# Patient Record
Sex: FEMALE | Race: WHITE | NOT HISPANIC OR LATINO
[De-identification: names, ages, dates, MRNs, and addresses within clinical notes are randomized per-mention and may not be internally consistent; named-entity substitution may affect disease eponyms.]

---

## 2018-05-24 ENCOUNTER — RECORD ABSTRACTING (OUTPATIENT)
Age: 63
End: 2018-05-24

## 2018-05-24 DIAGNOSIS — Z83.79 FAMILY HISTORY OF OTHER DISEASES OF THE DIGESTIVE SYSTEM: ICD-10-CM

## 2018-05-24 RX ORDER — RABEPRAZOLE SODIUM 20 MG/1
20 TABLET, DELAYED RELEASE ORAL DAILY
Refills: 0 | Status: ACTIVE | COMMUNITY

## 2018-05-24 RX ORDER — APIXABAN 5 MG/1
5 TABLET, FILM COATED ORAL
Refills: 0 | Status: ACTIVE | COMMUNITY

## 2018-05-24 RX ORDER — DESLORATADINE 5 MG/1
5 TABLET ORAL DAILY
Refills: 0 | Status: ACTIVE | COMMUNITY

## 2018-05-24 RX ORDER — UBIDECARENONE 200 MG
CAPSULE ORAL
Refills: 0 | Status: ACTIVE | COMMUNITY

## 2018-05-24 RX ORDER — ADHESIVE TAPE 3"X 2.3 YD
50 MCG TAPE, NON-MEDICATED TOPICAL
Refills: 0 | Status: ACTIVE | COMMUNITY

## 2018-05-24 RX ORDER — MONTELUKAST SODIUM 10 MG/1
10 TABLET, FILM COATED ORAL DAILY
Refills: 0 | Status: ACTIVE | COMMUNITY

## 2018-05-24 RX ORDER — FLUTICASONE PROPIONATE AND SALMETEROL XINAFOATE 115; 21 UG/1; UG/1
115-21 AEROSOL, METERED RESPIRATORY (INHALATION) TWICE DAILY
Refills: 0 | Status: ACTIVE | COMMUNITY

## 2018-06-28 ENCOUNTER — APPOINTMENT (OUTPATIENT)
Dept: BREAST CENTER | Facility: CLINIC | Age: 63
End: 2018-06-28
Payer: COMMERCIAL

## 2018-06-28 VITALS
WEIGHT: 167 LBS | HEIGHT: 62 IN | SYSTOLIC BLOOD PRESSURE: 129 MMHG | DIASTOLIC BLOOD PRESSURE: 67 MMHG | HEART RATE: 81 BPM | BODY MASS INDEX: 30.73 KG/M2

## 2018-06-28 PROCEDURE — 99213 OFFICE O/P EST LOW 20 MIN: CPT

## 2018-12-19 ENCOUNTER — MOBILE ON CALL (OUTPATIENT)
Age: 63
End: 2018-12-19

## 2019-01-10 ENCOUNTER — APPOINTMENT (OUTPATIENT)
Dept: BREAST CENTER | Facility: CLINIC | Age: 64
End: 2019-01-10
Payer: MEDICARE

## 2019-01-10 VITALS
HEIGHT: 62 IN | SYSTOLIC BLOOD PRESSURE: 132 MMHG | BODY MASS INDEX: 30.91 KG/M2 | HEART RATE: 82 BPM | DIASTOLIC BLOOD PRESSURE: 70 MMHG | WEIGHT: 168 LBS

## 2019-01-10 DIAGNOSIS — Z87.898 PERSONAL HISTORY OF OTHER SPECIFIED CONDITIONS: ICD-10-CM

## 2019-01-10 DIAGNOSIS — Z86.79 PERSONAL HISTORY OF OTHER DISEASES OF THE CIRCULATORY SYSTEM: ICD-10-CM

## 2019-01-10 DIAGNOSIS — Z86.69 PERSONAL HISTORY OF OTHER DISEASES OF THE NERVOUS SYSTEM AND SENSE ORGANS: ICD-10-CM

## 2019-01-10 DIAGNOSIS — Z87.39 PERSONAL HISTORY OF OTHER DISEASES OF THE MUSCULOSKELETAL SYSTEM AND CONNECTIVE TISSUE: ICD-10-CM

## 2019-01-10 DIAGNOSIS — Z86.39 PERSONAL HISTORY OF OTHER ENDOCRINE, NUTRITIONAL AND METABOLIC DISEASE: ICD-10-CM

## 2019-01-10 DIAGNOSIS — E06.1 SUBACUTE THYROIDITIS: ICD-10-CM

## 2019-01-10 PROCEDURE — 99214 OFFICE O/P EST MOD 30 MIN: CPT

## 2019-01-10 RX ORDER — DILTIAZEM HYDROCHLORIDE 120 MG/1
120 CAPSULE, EXTENDED RELEASE ORAL
Refills: 0 | Status: ACTIVE | COMMUNITY

## 2019-01-10 RX ORDER — OLOPATADINE HCL 1 MG/ML
0.1 SOLUTION/ DROPS OPHTHALMIC
Refills: 0 | Status: DISCONTINUED | COMMUNITY
End: 2019-01-10

## 2019-01-10 RX ORDER — MULTIVIT-MIN/IRON/FOLIC ACID/K 18-600-40
400 CAPSULE ORAL
Refills: 0 | Status: ACTIVE | COMMUNITY

## 2019-01-10 RX ORDER — DILTIAZEM HYDROCHLORIDE 120 MG/1
120 CAPSULE, COATED, EXTENDED RELEASE ORAL DAILY
Refills: 0 | Status: DISCONTINUED | COMMUNITY
End: 2019-01-10

## 2019-01-10 RX ORDER — LEVALBUTEROL TARTRATE 45 UG/1
45 AEROSOL, METERED ORAL
Refills: 0 | Status: ACTIVE | COMMUNITY

## 2019-01-10 RX ORDER — CHLORHEXIDINE GLUCONATE 4 %
325 (65 FE) LIQUID (ML) TOPICAL DAILY
Refills: 0 | Status: DISCONTINUED | COMMUNITY
End: 2019-01-10

## 2019-01-10 RX ORDER — EZETIMIBE 10 MG/1
10 TABLET ORAL
Refills: 0 | Status: DISCONTINUED | COMMUNITY
End: 2019-01-10

## 2019-01-10 NOTE — HISTORY OF PRESENT ILLNESS
[FreeTextEntry1] : Pt had allergic rxn to CT Iodine dye and PCN.\par D/c'ed Zetia s/t sx's\par S/P R Br Bx (11/22/93): Benign\par +L Ov Cyst (TVU)(12/15/17) > 6 mos F/U rec'ed > had NOT had yet > discussed.\par No MH/FH changes. ROS reviewed/discussed. Taking Ca/Vit D. Vit D level (9/17): 39. Last Bone Densitometry (12/15): +osteopenia\par Mammo/Sono (12/17/18): NSF\par Thyroid Sono (8/10/18): +sl increased bilat thyroid nodules

## 2019-01-10 NOTE — PHYSICAL EXAM
[Normocephalic] : normocephalic [Atraumatic] : atraumatic [Supple] : supple [No Supraclavicular Adenopathy] : no supraclavicular adenopathy [No Cervical Adenopathy] : no cervical adenopathy [Normal Sinus Rhythm] : normal sinus rhythm [Examined in the supine and seated position] : examined in the supine and seated position [No dominant masses] : no dominant masses in right breast  [No dominant masses] : no dominant masses left breast [No Nipple Retraction] : no left nipple retraction [No Nipple Discharge] : no left nipple discharge [No Axillary Lymphadenopathy] : no left axillary lymphadenopathy [No Edema] : no edema [No Rashes] : no rashes [No Ulceration] : no ulceration [de-identified] : +thyromegaly > old

## 2019-07-11 ENCOUNTER — APPOINTMENT (OUTPATIENT)
Dept: BREAST CENTER | Facility: CLINIC | Age: 64
End: 2019-07-11
Payer: MEDICARE

## 2019-07-11 VITALS
WEIGHT: 172 LBS | SYSTOLIC BLOOD PRESSURE: 133 MMHG | HEIGHT: 62 IN | BODY MASS INDEX: 31.65 KG/M2 | HEART RATE: 94 BPM | DIASTOLIC BLOOD PRESSURE: 86 MMHG

## 2019-07-11 DIAGNOSIS — E07.9 DISORDER OF THYROID, UNSPECIFIED: ICD-10-CM

## 2019-07-11 PROCEDURE — 99214 OFFICE O/P EST MOD 30 MIN: CPT

## 2019-07-11 RX ORDER — PYRIDOXINE HCL (VITAMIN B6) 50 MG
50 TABLET ORAL
Refills: 0 | Status: DISCONTINUED | COMMUNITY
End: 2019-07-11

## 2019-07-11 NOTE — HISTORY OF PRESENT ILLNESS
[FreeTextEntry1] : Pt had allergic rxn to CT Iodine dye and PCN.\par D/c'ed Zetia s/t sx's\par S/P R Br Bx (11/22/93): Benign\par +L Ov Cyst (TVU)(12/15/17) > 6 mos F/U rec'ed > had NOT had yet > discussed.\par Pt had elevated Vit B6 level > d/c'ed\par Seeing Ophth re: L optic nerve problems\par No other MH/FH changes. ROS reviewed/discussed. Taking Ca/Vit D. Vit D level (9/17): 39. Last Bone Densitometry (12/15): +osteopenia\par Mammo/Sono (12/17/18): NSF\par Thyroid Sono (8/10/18): +sl increased bilat thyroid nodules

## 2019-07-11 NOTE — PHYSICAL EXAM
[Normocephalic] : normocephalic [Atraumatic] : atraumatic [Supple] : supple [No Supraclavicular Adenopathy] : no supraclavicular adenopathy [No Cervical Adenopathy] : no cervical adenopathy [Normal Sinus Rhythm] : normal sinus rhythm [Examined in the supine and seated position] : examined in the supine and seated position [No dominant masses] : no dominant masses in right breast  [No dominant masses] : no dominant masses left breast [No Nipple Retraction] : no left nipple retraction [No Nipple Discharge] : no left nipple discharge [No Axillary Lymphadenopathy] : no left axillary lymphadenopathy [No Edema] : no edema [No Rashes] : no rashes [No Ulceration] : no ulceration [de-identified] : +diffusely enlarged gland

## 2020-01-23 ENCOUNTER — APPOINTMENT (OUTPATIENT)
Dept: BREAST CENTER | Facility: CLINIC | Age: 65
End: 2020-01-23
Payer: MEDICARE

## 2020-01-23 VITALS
HEART RATE: 67 BPM | BODY MASS INDEX: 32.57 KG/M2 | DIASTOLIC BLOOD PRESSURE: 71 MMHG | HEIGHT: 62 IN | WEIGHT: 177 LBS | SYSTOLIC BLOOD PRESSURE: 144 MMHG

## 2020-01-23 PROCEDURE — 99214 OFFICE O/P EST MOD 30 MIN: CPT

## 2020-01-23 RX ORDER — VALACYCLOVIR 1 G/1
1 TABLET, FILM COATED ORAL
Refills: 0 | Status: DISCONTINUED | COMMUNITY
End: 2020-01-23

## 2020-01-23 NOTE — HISTORY OF PRESENT ILLNESS
[FreeTextEntry1] : Pt had allergic rxn to CT Iodine dye and PCN.\par D/c'ed Zetia s/t sx's\par S/P R Br Bx (11/22/93): Benign\par +L Ov Cyst (TVU)(12/15/17) > 6 mos F/U rec'ed > had NOT had yet > discussed.\par Pt had elevated Vit B6 level > d/c'ed\par Seeing Ophth re: L optic nerve problems\par On Eliquis for A. FIb\par No other MH/FH changes. ROS reviewed/discussed. Taking Ca/Vit D. Vit D level (7/19): 36. Last Bone Densitometry (12/19): +osteopenia\par Mammo/Sono (12/17/19): HD, NSF\par Thyroid Sono (12/12/19): +multinod goiter > Rad[employer] wants old films or 6 mos F/U

## 2020-01-23 NOTE — PHYSICAL EXAM
[Normocephalic] : normocephalic [Atraumatic] : atraumatic [Supple] : supple [No Supraclavicular Adenopathy] : no supraclavicular adenopathy [No Cervical Adenopathy] : no cervical adenopathy [Normal Sinus Rhythm] : normal sinus rhythm [Examined in the supine and seated position] : examined in the supine and seated position [No dominant masses] : no dominant masses in right breast  [No dominant masses] : no dominant masses left breast [No Nipple Retraction] : no right nipple retraction [No Nipple Discharge] : no left nipple discharge [No Axillary Lymphadenopathy] : no left axillary lymphadenopathy [No Edema] : no edema [No Rashes] : no rashes [No Ulceration] : no ulceration [de-identified] : +thyromegaly > old

## 2020-09-10 DIAGNOSIS — Z63.5 DISRUPTION OF FAMILY BY SEPARATION AND DIVORCE: ICD-10-CM

## 2020-09-10 SDOH — SOCIAL STABILITY - SOCIAL INSECURITY: DISRUPTION OF FAMILY BY SEPARATION AND DIVORCE: Z63.5

## 2020-09-22 ENCOUNTER — APPOINTMENT (OUTPATIENT)
Dept: GASTROENTEROLOGY | Facility: CLINIC | Age: 65
End: 2020-09-22
Payer: MEDICARE

## 2020-09-22 VITALS
SYSTOLIC BLOOD PRESSURE: 133 MMHG | HEIGHT: 62 IN | BODY MASS INDEX: 32.57 KG/M2 | HEART RATE: 67 BPM | TEMPERATURE: 97.1 F | WEIGHT: 177 LBS | DIASTOLIC BLOOD PRESSURE: 83 MMHG

## 2020-09-22 DIAGNOSIS — R19.8 OTHER SPECIFIED SYMPTOMS AND SIGNS INVOLVING THE DIGESTIVE SYSTEM AND ABDOMEN: ICD-10-CM

## 2020-09-22 DIAGNOSIS — R07.89 OTHER CHEST PAIN: ICD-10-CM

## 2020-09-22 DIAGNOSIS — I65.22 OCCLUSION AND STENOSIS OF LEFT CAROTID ARTERY: ICD-10-CM

## 2020-09-22 DIAGNOSIS — N83.209 UNSPECIFIED OVARIAN CYST, UNSPECIFIED SIDE: ICD-10-CM

## 2020-09-22 DIAGNOSIS — R13.10 DYSPHAGIA, UNSPECIFIED: ICD-10-CM

## 2020-09-22 PROCEDURE — 82274 ASSAY TEST FOR BLOOD FECAL: CPT | Mod: QW

## 2020-09-22 PROCEDURE — 99215 OFFICE O/P EST HI 40 MIN: CPT

## 2020-09-22 RX ORDER — DILTIAZEM HYDROCHLORIDE 30 MG/1
30 TABLET ORAL
Refills: 0 | Status: DISCONTINUED | COMMUNITY
End: 2020-09-22

## 2020-09-22 NOTE — PHYSICAL EXAM
[General Appearance - Alert] : alert [General Appearance - In No Acute Distress] : in no acute distress [General Appearance - Well Developed] : well developed [General Appearance - Well-Appearing] : healthy appearing [Sclera] : the sclera and conjunctiva were normal [PERRL With Normal Accommodation] : pupils were equal in size, round, and reactive to light [Extraocular Movements] : extraocular movements were intact [Strabismus] : no strabismus was seen [Outer Ear] : the ears and nose were normal in appearance [Examination Of The Oral Cavity] : the lips and gums were normal [Both Tympanic Membranes Were Examined] : both tympanic membranes were normal [Nasal Cavity] : the nasal mucosa and septum were normal [Oropharynx] : the oropharynx was normal [Neck Appearance] : the appearance of the neck was normal [Neck Cervical Mass (___cm)] : no neck mass was observed [Jugular Venous Distention Increased] : there was no jugular-venous distention [Thyroid Diffuse Enlargement] : the thyroid was not enlarged [Auscultation Breath Sounds / Voice Sounds] : lungs were clear to auscultation bilaterally [Lungs Percussion] : the lungs were normal to percussion [Heart Rate And Rhythm] : heart rate was normal and rhythm regular [Heart Sounds] : normal S1 and S2 [Heart Sounds Gallop] : no gallops [Murmurs] : no murmurs [Heart Sounds Pericardial Friction Rub] : no pericardial rub [Arterial Pulses Carotid] : carotid pulses were normal with no bruits [Abdominal Aorta] : the abdominal aorta was normal [Full Pulse] : the pedal pulses are present [Edema] : there was no peripheral edema [Veins - Varicosity Changes] : there were no varicosital changes [Breast Appearance] : normal in appearance [Breast Palpation Mass] : no palpable masses [Breast Abnormal Lactation (Galactorrhea)] : no nipple discharge [Bowel Sounds] : normal bowel sounds [Abdomen Soft] : soft [Abdomen Tenderness] : non-tender [Abdomen Mass (___ Cm)] : no abdominal mass palpated [Abdomen Hernia] : no hernia was discovered [Normal Sphincter Tone] : normal sphincter tone [No Rectal Mass] : no rectal mass [Occult Blood Positive] : stool was negative for occult blood [FreeTextEntry1] : Stool brown, negative Hemoccult, negative iFOBT [Cervical Lymph Nodes Enlarged Posterior Bilaterally] : posterior cervical [Cervical Lymph Nodes Enlarged Anterior Bilaterally] : anterior cervical [Supraclavicular Lymph Nodes Enlarged Bilaterally] : supraclavicular [Axillary Lymph Nodes Enlarged Bilaterally] : axillary [Femoral Lymph Nodes Enlarged Bilaterally] : femoral [Inguinal Lymph Nodes Enlarged Bilaterally] : inguinal [No CVA Tenderness] : no ~M costovertebral angle tenderness [No Spinal Tenderness] : no spinal tenderness [Abnormal Walk] : normal gait [Nail Clubbing] : no clubbing  or cyanosis of the fingernails [Involuntary Movements] : no involuntary movements were seen [Musculoskeletal - Swelling] : no joint swelling seen [Motor Tone] : muscle strength and tone were normal [Skin Color & Pigmentation] : normal skin color and pigmentation [Skin Turgor] : normal skin turgor [] : no rash [Skin Lesions] : no skin lesions [No Focal Deficits] : no focal deficits [Oriented To Time, Place, And Person] : oriented to person, place, and time [Impaired Insight] : insight and judgment were intact [Affect] : the affect was normal [Mood] : the mood was normal

## 2020-09-22 NOTE — ASSESSMENT
[FreeTextEntry1] : 1.  Dyspepsia/GERD/chest tightness/dysphagia-rule out esophageal motility disorder-upper endoscopy June 24, 2013 revealed antral gastritis and a fundic gland polyp.\par 2.  Suspect irritable bowel syndrome-colonoscopy June 24, 2013 revealed internal hemorrhoids-rule out colon polyps or lesions.\par 3.  Paroxysmal atrial fibrillation; mitral valve prolapse.\par 4.  Anxiety.\par 5.  Obstructive sleep apnea on BiPAP machine.\par 6.  Bronchial asthma.\par 7.  Multinodular thyroid.\par 8.  Osteopenia.\par 9.  Left carotid artery stenosis.\par 10.  Status post endometrial polyp.\par 11.  Iron deficiency anemia-resolved..\par 12.  Hypercholesterolemia.\par \par Plan:\par 1.  The patient was advised to schedule an upper endoscopy and a colonoscopy.  Both procedures, material risks, benefits and alternatives were discussed with the patient at length.  Brochures given.  The patient was advised to schedule a colonoscopy.  The procedure, material risks, benefits and alternatives were discussed with the patient at length.  Brochure given.  PEG prep.  The patient will stop Eliquis for at least 3 days prior to the endoscopic procedures.\par 2.  Extensive blood tests and lab results were reviewed.\par 3.  For the time being, the patient will continue to take rabeprazole 20 mg once daily.\par 4.  If GI symptoms persist and the endoscopic procedures are negative, consider CT scan of the abdomen and pelvis.

## 2020-09-22 NOTE — REASON FOR VISIT
[Follow-Up: _____] : a [unfilled] follow-up visit [FreeTextEntry1] : Abdominal pain, nausea, feeling of food getting stuck in throat

## 2020-09-22 NOTE — HISTORY OF PRESENT ILLNESS
[FreeTextEntry1] : Kati was last seen in the office 7 years ago.  She presents now with many GI complaints similar to what she has had in the past.  Starting in February 2019, she saw her PCP for upper abdominal pain, lower abdominal pain, soft stool, increased gas, nausea, foul-smelling stool and mucus in the stool.  A stool for occult blood was negative at that time and her PCP thought that she had irritable bowel syndrome.  She is also been complaining of a dry hoarse throat and it feels like there is something stuck in her esophagus when she eats food.  She continues to take rabeprazole 20 mg for heartburn, and this seems to control her reflux symptoms.  She does complain of tightness in her chest and sometimes it feels like her heart is racing.  She has paroxysmal atrial fibrillation and takes Eliquis for electrically.  She also has sleep apnea syndrome and uses a BiPAP machine.  Her cholesterol levels have been elevated, but she has been intolerant to all statins and probably also Zetia.  According to her, her CK became elevated when she was on the statins.

## 2020-09-22 NOTE — REVIEW OF SYSTEMS
[Dry Eyes] : dryness of the eyes [Abdominal Pain] : abdominal pain [Negative] : Heme/Lymph [FreeTextEntry5] : CITLALI dale  [FreeTextEntry6] : asthma/ allergies

## 2020-09-22 NOTE — CONSULT LETTER
[Dear  ___] : Dear  [unfilled], [Consult Letter:] : I had the pleasure of evaluating your patient, [unfilled]. [( Thank you for referring [unfilled] for consultation for _____ )] : Thank you for referring [unfilled] for consultation for [unfilled] [Please see my note below.] : Please see my note below. [Consult Closing:] : Thank you very much for allowing me to participate in the care of this patient.  If you have any questions, please do not hesitate to contact me. [Sincerely,] : Sincerely, [FreeTextEntry3] : Benedict Higgins MD

## 2020-10-01 DIAGNOSIS — Z01.818 ENCOUNTER FOR OTHER PREPROCEDURAL EXAMINATION: ICD-10-CM

## 2020-10-02 ENCOUNTER — APPOINTMENT (OUTPATIENT)
Dept: DISASTER EMERGENCY | Facility: CLINIC | Age: 65
End: 2020-10-02

## 2020-10-02 ENCOUNTER — LABORATORY RESULT (OUTPATIENT)
Age: 65
End: 2020-10-02

## 2020-10-05 ENCOUNTER — LABORATORY RESULT (OUTPATIENT)
Age: 65
End: 2020-10-05

## 2020-10-06 ENCOUNTER — APPOINTMENT (OUTPATIENT)
Dept: GASTROENTEROLOGY | Facility: CLINIC | Age: 65
End: 2020-10-06
Payer: MEDICARE

## 2020-10-06 PROCEDURE — 45380 COLONOSCOPY AND BIOPSY: CPT | Mod: PT

## 2020-10-06 PROCEDURE — 43239 EGD BIOPSY SINGLE/MULTIPLE: CPT | Mod: 59

## 2021-01-14 ENCOUNTER — APPOINTMENT (OUTPATIENT)
Dept: BREAST CENTER | Facility: CLINIC | Age: 66
End: 2021-01-14
Payer: MEDICARE

## 2021-01-14 VITALS
BODY MASS INDEX: 33.86 KG/M2 | WEIGHT: 184 LBS | HEIGHT: 62 IN | SYSTOLIC BLOOD PRESSURE: 144 MMHG | HEART RATE: 82 BPM | DIASTOLIC BLOOD PRESSURE: 91 MMHG

## 2021-01-14 DIAGNOSIS — Z12.31 ENCOUNTER FOR SCREENING MAMMOGRAM FOR MALIGNANT NEOPLASM OF BREAST: ICD-10-CM

## 2021-01-14 PROCEDURE — 99214 OFFICE O/P EST MOD 30 MIN: CPT

## 2021-01-14 RX ORDER — POLYETHYLENE GLYOCOL 3350, SODIUM CHLORIDE, SODIUM BICARBONATE AND POTASSIUM CHLORIDE 420; 11.2; 5.72; 1.48 G/4L; G/4L; G/4L; G/4L
420 POWDER, FOR SOLUTION NASOGASTRIC; ORAL
Qty: 1 | Refills: 0 | Status: DISCONTINUED | COMMUNITY
Start: 2020-09-22 | End: 2021-01-14

## 2021-01-14 NOTE — PHYSICAL EXAM
[Normocephalic] : normocephalic [Atraumatic] : atraumatic [Supple] : supple [No Supraclavicular Adenopathy] : no supraclavicular adenopathy [No Cervical Adenopathy] : no cervical adenopathy [No Thyromegaly] : no thyromegaly [Normal Sinus Rhythm] : normal sinus rhythm [Examined in the supine and seated position] : examined in the supine and seated position [No dominant masses] : no dominant masses in right breast  [No dominant masses] : no dominant masses left breast [No Nipple Retraction] : no left nipple retraction [No Nipple Discharge] : no left nipple discharge [No Axillary Lymphadenopathy] : no left axillary lymphadenopathy [No Edema] : no edema [No Rashes] : no rashes [No Ulceration] : no ulceration [de-identified] : +stable L flank lipoma

## 2021-01-14 NOTE — HISTORY OF PRESENT ILLNESS
[FreeTextEntry1] : Pt had allergic rxn to CT Iodine dye and PCN.\par D/c'ed Zetia s/t sx's\par S/P R Br Bx (11/22/93): Benign\par +L Ov Cyst (TVU)(12/15/17) > 6 mos F/U rec'ed > had NOT had yet > discussed.\par Pt had elevated Vit B6 level > d/c'ed\par Seeing Ophth re: L optic nerve problems\par On Eliquis for A. FIb\par Pt fell (12/18/20) injuring R wrist > No fx\par No other MH/FH changes. ROS reviewed/discussed. Taking Ca/Vit D. Vit D level (7/19): 36. Last Bone Densitometry (12/19): +osteopenia\par Mammo/Sono (12/18/20): HD, NSF\par Thyroid Sono (6/18/20): +multinod goiter

## 2022-01-27 ENCOUNTER — APPOINTMENT (OUTPATIENT)
Dept: BREAST CENTER | Facility: CLINIC | Age: 67
End: 2022-01-27
Payer: MEDICARE

## 2022-01-27 ENCOUNTER — NON-APPOINTMENT (OUTPATIENT)
Age: 67
End: 2022-01-27

## 2022-01-27 VITALS
WEIGHT: 179 LBS | DIASTOLIC BLOOD PRESSURE: 75 MMHG | SYSTOLIC BLOOD PRESSURE: 135 MMHG | HEIGHT: 62 IN | BODY MASS INDEX: 32.94 KG/M2 | HEART RATE: 77 BPM

## 2022-01-27 PROCEDURE — 99214 OFFICE O/P EST MOD 30 MIN: CPT

## 2022-01-27 RX ORDER — SOTALOL HYDROCHLORIDE 80 MG/1
80 TABLET ORAL
Refills: 0 | Status: ACTIVE | COMMUNITY

## 2022-01-27 NOTE — PHYSICAL EXAM
[Normocephalic] : normocephalic [Atraumatic] : atraumatic [Supple] : supple [No Supraclavicular Adenopathy] : no supraclavicular adenopathy [No Cervical Adenopathy] : no cervical adenopathy [No Thyromegaly] : no thyromegaly [Normal Sinus Rhythm] : normal sinus rhythm [Examined in the supine and seated position] : examined in the supine and seated position [No dominant masses] : no dominant masses in right breast  [No dominant masses] : no dominant masses left breast [No Nipple Retraction] : no left nipple retraction [No Nipple Discharge] : no left nipple discharge [No Axillary Lymphadenopathy] : no left axillary lymphadenopathy [No Edema] : no edema [No Rashes] : no rashes [No Ulceration] : no ulceration [de-identified] : +smooth oval soft mass L 4:00 IMF c/w lipoma > observe\par NSF\par +stable L flank lipoma

## 2022-01-27 NOTE — HISTORY OF PRESENT ILLNESS
[FreeTextEntry1] : Pt had allergic rxn to CT Iodine dye and PCN.\par D/c'ed Zetia s/t sx's\par S/P R Br Bx (11/22/93): Benign\par +L Ov Cyst (TVU)(12/15/17) > 6 mos F/U rec'ed > had NOT had yet > discussed.\par Pt had elevated Vit B6 level > d/c'ed\par Seeing Ophth re: L optic nerve problems\par On Eliquis for A. FIb\par S/P ablation (11/21)([place]) > NSR > on ethacrynic acid/KCl suppl\par Pt fell (12/18/20) injuring R wrist > No fx\par Got Moderna booster (12/21)([facility])\par No other MH/FH changes. ROS reviewed/discussed. Taking Ca/Vit D. Vit D level (2019): 36. Last Bone Densitometry (12/21): +osteopenia\par Mammo/Sono (12/20/21): HD, NSF\par Thyroid Sono (6/29/21): +stable multinod goiter > F/U Thyroid Sono (6/23)

## 2022-12-21 ENCOUNTER — RESULT REVIEW (OUTPATIENT)
Age: 67
End: 2022-12-21

## 2022-12-21 ENCOUNTER — APPOINTMENT (OUTPATIENT)
Dept: MAMMOGRAPHY | Facility: CLINIC | Age: 67
End: 2022-12-21

## 2022-12-21 ENCOUNTER — APPOINTMENT (OUTPATIENT)
Dept: ULTRASOUND IMAGING | Facility: CLINIC | Age: 67
End: 2022-12-21

## 2022-12-21 PROCEDURE — 76641 ULTRASOUND BREAST COMPLETE: CPT | Mod: 50

## 2022-12-21 PROCEDURE — 77063 BREAST TOMOSYNTHESIS BI: CPT

## 2022-12-21 PROCEDURE — 77067 SCR MAMMO BI INCL CAD: CPT

## 2023-01-12 ENCOUNTER — APPOINTMENT (OUTPATIENT)
Dept: BREAST CENTER | Facility: CLINIC | Age: 68
End: 2023-01-12
Payer: MEDICARE

## 2023-01-12 VITALS
SYSTOLIC BLOOD PRESSURE: 149 MMHG | DIASTOLIC BLOOD PRESSURE: 76 MMHG | HEART RATE: 84 BPM | HEIGHT: 62 IN | BODY MASS INDEX: 32.94 KG/M2 | WEIGHT: 179 LBS

## 2023-01-12 DIAGNOSIS — R92.2 INCONCLUSIVE MAMMOGRAM: ICD-10-CM

## 2023-01-12 PROCEDURE — 99214 OFFICE O/P EST MOD 30 MIN: CPT

## 2023-01-12 RX ORDER — MULTIVITAMIN
TABLET ORAL
Refills: 0 | Status: DISCONTINUED | COMMUNITY
End: 2023-01-12

## 2023-01-12 RX ORDER — POTASSIUM CHLORIDE 20 MEQ
20 TABLET, EXT RELEASE, PARTICLES/CRYSTALS ORAL
Refills: 0 | Status: DISCONTINUED | COMMUNITY
End: 2023-01-12

## 2023-01-12 RX ORDER — ETHACRYNIC ACID 25 MG/1
25 TABLET ORAL
Refills: 0 | Status: DISCONTINUED | COMMUNITY
End: 2023-01-12

## 2023-01-12 RX ORDER — DILTIAZEM HYDROCHLORIDE 60 MG/1
60 TABLET, COATED ORAL
Refills: 0 | Status: DISCONTINUED | COMMUNITY
End: 2023-01-12

## 2023-01-12 NOTE — HISTORY OF PRESENT ILLNESS
[FreeTextEntry1] : Pt had allergic rxn to CT Iodine dye and PCN.\par D/c'ed Zetia s/t sx's\par S/P R Br Bx (11/22/93): Benign\par +L Ov Cyst (TVU)(12/15/17) > 6 mos F/U rec'ed > had NOT had yet > discussed.\par Pt had elevated Vit B6 level > d/c'ed\par Seeing Ophth re: L optic nerve problems\par On Eliquis for A. FIb\par S/P ablation (11/21)([place]) > NSR\par Had pneumonia (8/22) > doxy > recoverd\par Had COVID (12/22) > recovered\par Got Moderna booster (12/21)([facility])\par No other MH/FH changes. ROS reviewed/discussed. Taking Ca/Vit D. Vit D level (2019): 36. Last Bone Densitometry (12/21): +osteopenia\par Mammo/Sono (12/21/22): FG, NSF\par Thyroid Sono (6/29/21): +stable multinod goiter > F/U Thyroid Sono (6/23)

## 2023-01-12 NOTE — PHYSICAL EXAM
[Normocephalic] : normocephalic [Atraumatic] : atraumatic [Supple] : supple [No Supraclavicular Adenopathy] : no supraclavicular adenopathy [No Cervical Adenopathy] : no cervical adenopathy [Normal Sinus Rhythm] : normal sinus rhythm [Examined in the supine and seated position] : examined in the supine and seated position [No dominant masses] : no dominant masses in right breast  [No dominant masses] : no dominant masses left breast [No Nipple Retraction] : no left nipple retraction [No Nipple Discharge] : no left nipple discharge [No Axillary Lymphadenopathy] : no left axillary lymphadenopathy [No Edema] : no edema [No Rashes] : no rashes [No Ulceration] : no ulceration [de-identified] : +stable thyromegaly [de-identified] : +FC tissue\par NSF  [de-identified] : +1cm oval mass L 4:00 IMF c/w lipoma\par +FC tissue\par NSF  [de-identified] : +3cm lipoma L ant lat abd wall > stable

## 2023-06-29 ENCOUNTER — APPOINTMENT (OUTPATIENT)
Dept: ULTRASOUND IMAGING | Facility: CLINIC | Age: 68
End: 2023-06-29
Payer: MEDICARE

## 2023-06-29 PROCEDURE — 76536 US EXAM OF HEAD AND NECK: CPT

## 2023-07-10 ENCOUNTER — APPOINTMENT (OUTPATIENT)
Dept: GASTROENTEROLOGY | Facility: CLINIC | Age: 68
End: 2023-07-10
Payer: MEDICARE

## 2023-07-10 VITALS
DIASTOLIC BLOOD PRESSURE: 78 MMHG | SYSTOLIC BLOOD PRESSURE: 150 MMHG | WEIGHT: 188 LBS | HEIGHT: 62 IN | HEART RATE: 78 BPM | BODY MASS INDEX: 34.6 KG/M2

## 2023-07-10 DIAGNOSIS — G47.33 OBSTRUCTIVE SLEEP APNEA (ADULT) (PEDIATRIC): ICD-10-CM

## 2023-07-10 DIAGNOSIS — K21.9 GASTRO-ESOPHAGEAL REFLUX DISEASE W/OUT ESOPHAGITIS: ICD-10-CM

## 2023-07-10 DIAGNOSIS — E78.00 PURE HYPERCHOLESTEROLEMIA, UNSPECIFIED: ICD-10-CM

## 2023-07-10 DIAGNOSIS — J45.909 UNSPECIFIED ASTHMA, UNCOMPLICATED: ICD-10-CM

## 2023-07-10 DIAGNOSIS — K58.9 IRRITABLE BOWEL SYNDROME W/OUT DIARRHEA: ICD-10-CM

## 2023-07-10 DIAGNOSIS — I34.1 NONRHEUMATIC MITRAL (VALVE) PROLAPSE: ICD-10-CM

## 2023-07-10 DIAGNOSIS — F41.9 ANXIETY DISORDER, UNSPECIFIED: ICD-10-CM

## 2023-07-10 PROCEDURE — 99214 OFFICE O/P EST MOD 30 MIN: CPT | Mod: 25

## 2023-07-10 PROCEDURE — 82274 ASSAY TEST FOR BLOOD FECAL: CPT | Mod: QW

## 2023-07-10 RX ORDER — TIMOLOL MALEATE 5 MG/ML
0.5 SOLUTION OPHTHALMIC
Qty: 15 | Refills: 0 | Status: ACTIVE | COMMUNITY
Start: 2022-10-06

## 2023-07-10 RX ORDER — CALCIUM CITRATE/VITAMIN D3 200MG-6.25
200-6.25 TABLET ORAL
Refills: 0 | Status: ACTIVE | COMMUNITY

## 2023-07-10 RX ORDER — RABEPRAZOLE SODIUM 20 MG/1
20 TABLET, DELAYED RELEASE ORAL TWICE DAILY
Qty: 180 | Refills: 3 | Status: ACTIVE | COMMUNITY
Start: 2023-07-10 | End: 1900-01-01

## 2023-07-10 NOTE — REASON FOR VISIT
[Consultation] : a consultation visit [FreeTextEntry1] : Pain in the mid abdomen with occasional nausea

## 2023-07-10 NOTE — HISTORY OF PRESENT ILLNESS
[FreeTextEntry1] : Since she was last seen, Kati has had intermittent epigastric pain with radiation to the umbilicus, belching, nausea and occasional soft stool.  It seems like the symptoms have eased up at the present time, but she was concerned.  She has been taking rabeprazole 20 mg once daily.  On January 25, 2022, she had a cardiac ablation for atrial fibrillation.  She developed costochondritis after this procedure, but is well now.  In August 2022 she had pneumonia which precipitated asthmatic symptoms.  She was treated with antibiotics and recovered.  In December 2022, she had coughing and fever, which was diagnosed as RSV.  She was treated with 2 courses of antibiotics and prednisone.  On December 29, 2022, she had COVID, and recovered well.  At the end of 2022 she was found to have a mild elevation in liver enzymes, which returned to normal in January 2023.  A sonogram did not reveal a fatty liver, but the patient has gained weight since she was last seen.  She recently had a sonogram of her thyroid, and her nodules have remained the same.

## 2023-07-10 NOTE — ASSESSMENT
[FreeTextEntry1] : Assessment:\par 1. Dyspepsia/GERD/chest tightness/dysphagia-rule out esophageal motility disorder-upper endoscopy October 16, 2020 revealed reflux changes and fundic polyps; upper endoscopy June 24, 2013 revealed antral gastritis and a fundic gland polyp.\par 2. Suspect irritable bowel syndrome-colonoscopy October 6, 2020 revealed a 2 mm rectal hyperplastic polyp and focal adenomatous change to a diminutive cecal polyp and internal hemorrhoids; colonoscopy June 24, 2013 revealed internal hemorrhoids.\par 3.  Atrial fibrillation-status post successful ablation; mitral valve prolapse.\par 4. Anxiety.\par 5. Obstructive sleep apnea on BiPAP machine.\par 6. Bronchial asthma.\par 7. Multinodular thyroid.\par 8. Osteopenia.\par 9. Left carotid artery stenosis.\par 10. Status post endometrial polyp.\par 11. Iron deficiency anemia-resolved..\par 12. Hypercholesterolemia.\par 13. Status post RSV pneumonia.\par 14. Status post COVID.\par 15. History of mild elevation in liver enzymes-resolved.\par Plan:\par 1.  The patient was advised to take rabeprazole 20 mg twice daily 30 to 45 minutes before breakfast and dinner only during the periods of time when her reflux symptoms flare.  After taking twice daily dosing, she will resume once a day dosing.\par 2.  Return in 1 year or sooner if any problems arise.\par 3.  Because of the mild elevation in liver enzymes in the past, the patient should have a repeat sonogram and possible liver elastography should her liver enzymes rise again.

## 2023-07-10 NOTE — PHYSICAL EXAM
[Alert] : alert [Normal Voice/Communication] : normal voice/communication [Healthy Appearing] : healthy appearing [No Acute Distress] : no acute distress [Well Developed] : well developed [Obese (BMI >= 30)] : obese (BMI >= 30) [Sclera] : the sclera and conjunctiva were normal [Hearing Threshold Finger Rub Not [place]] : hearing was normal [Normal Lips/Gums] : the lips and gums were normal [Oropharynx] : the oropharynx was normal [Normal Appearance] : the appearance of the neck was normal [No Neck Mass] : no neck mass was observed [No Respiratory Distress] : no respiratory distress [No Acc Muscle Use] : no accessory muscle use [Respiration, Rhythm And Depth] : normal respiratory rhythm and effort [Auscultation Breath Sounds / Voice Sounds] : lungs were clear to auscultation bilaterally [Heart Rate And Rhythm] : heart rate was normal and rhythm regular [Normal S1, S2] : normal S1 and S2 [Murmurs] : no murmurs [+1] : edema: +1 [Bowel Sounds] : normal bowel sounds [Abdomen Tenderness] : non-tender [No Masses] : no abdominal mass palpated [Abdomen Soft] : soft [No Hernia] : no hernia [Normal Sphincter Tone] : normal sphincter tone [No External Hemorrhoid] : no external hemorrhoids [No Rectal Mass] : no rectal mass [Cervical Lymph Nodes Enlarged Posterior Bilaterally] : no posterior cervical lymphadenopathy [Supraclavicular Lymph Nodes Enlarged Bilaterally] : no supraclavicular lymphadenopathy [Cervical Lymph Nodes Enlarged Anterior Bilaterally] : no anterior cervical lymphadenopathy [No CVA Tenderness] : no CVA  tenderness [No Spinal Tenderness] : no spinal tenderness [Abnormal Walk] : normal gait [No Clubbing, Cyanosis] : no clubbing or cyanosis of the fingernails [Involuntary Movements] : no involuntary movements were seen [No Joint Swelling] : no joint swelling seen [Normal Color / Pigmentation] : normal skin color and pigmentation [] : no rash [Normal Turgor] : normal skin turgor [Skin Lesions] : no skin lesions [No Focal Deficits] : no focal deficits [Oriented To Time, Place, And Person] : oriented to person, place, and time [Ascites: ___] : no ascites [Rebound Tenderness] : no rebound tenderness [Occult Blood] : negative occult blood [FIT Test] : negative FIT test [de-identified] : Tympanic membranes intact bilaterally [de-identified] : Thyroid is nodular bilaterally

## 2023-12-22 ENCOUNTER — RESULT REVIEW (OUTPATIENT)
Age: 68
End: 2023-12-22

## 2023-12-22 ENCOUNTER — APPOINTMENT (OUTPATIENT)
Dept: RADIOLOGY | Facility: CLINIC | Age: 68
End: 2023-12-22
Payer: MEDICARE

## 2023-12-22 ENCOUNTER — APPOINTMENT (OUTPATIENT)
Dept: ULTRASOUND IMAGING | Facility: CLINIC | Age: 68
End: 2023-12-22

## 2023-12-22 ENCOUNTER — APPOINTMENT (OUTPATIENT)
Dept: MAMMOGRAPHY | Facility: CLINIC | Age: 68
End: 2023-12-22
Payer: MEDICARE

## 2023-12-22 PROCEDURE — 77080 DXA BONE DENSITY AXIAL: CPT

## 2023-12-22 PROCEDURE — 77067 SCR MAMMO BI INCL CAD: CPT

## 2023-12-22 PROCEDURE — 76641 ULTRASOUND BREAST COMPLETE: CPT | Mod: 50,GY

## 2023-12-22 PROCEDURE — 77063 BREAST TOMOSYNTHESIS BI: CPT

## 2023-12-28 ENCOUNTER — APPOINTMENT (OUTPATIENT)
Dept: ULTRASOUND IMAGING | Facility: CLINIC | Age: 68
End: 2023-12-28
Payer: MEDICARE

## 2023-12-28 PROCEDURE — 76830 TRANSVAGINAL US NON-OB: CPT

## 2024-01-11 ENCOUNTER — APPOINTMENT (OUTPATIENT)
Dept: BREAST CENTER | Facility: CLINIC | Age: 69
End: 2024-01-11
Payer: MEDICARE

## 2024-01-11 VITALS — WEIGHT: 189.5 LBS | HEIGHT: 62 IN | BODY MASS INDEX: 34.87 KG/M2

## 2024-01-11 DIAGNOSIS — E66.9 OBESITY, UNSPECIFIED: ICD-10-CM

## 2024-01-11 DIAGNOSIS — Z86.16 PERSONAL HISTORY OF COVID-19: ICD-10-CM

## 2024-01-11 DIAGNOSIS — D17.9 BENIGN LIPOMATOUS NEOPLASM, UNSPECIFIED: ICD-10-CM

## 2024-01-11 DIAGNOSIS — M85.80 OTHER SPECIFIED DISORDERS OF BONE DENSITY AND STRUCTURE, UNSPECIFIED SITE: ICD-10-CM

## 2024-01-11 DIAGNOSIS — I48.0 PAROXYSMAL ATRIAL FIBRILLATION: ICD-10-CM

## 2024-01-11 DIAGNOSIS — E04.2 NONTOXIC MULTINODULAR GOITER: ICD-10-CM

## 2024-01-11 DIAGNOSIS — Z80.0 FAMILY HISTORY OF MALIGNANT NEOPLASM OF DIGESTIVE ORGANS: ICD-10-CM

## 2024-01-11 DIAGNOSIS — E04.9 NONTOXIC GOITER, UNSPECIFIED: ICD-10-CM

## 2024-01-11 DIAGNOSIS — N60.19 DIFFUSE CYSTIC MASTOPATHY OF UNSPECIFIED BREAST: ICD-10-CM

## 2024-01-11 PROCEDURE — 99214 OFFICE O/P EST MOD 30 MIN: CPT

## 2024-01-11 NOTE — PHYSICAL EXAM
[Normocephalic] : normocephalic [Atraumatic] : atraumatic [Supple] : supple [No Supraclavicular Adenopathy] : no supraclavicular adenopathy [No Cervical Adenopathy] : no cervical adenopathy [Examined in the supine and seated position] : examined in the supine and seated position [No dominant masses] : no dominant masses in right breast  [No dominant masses] : no dominant masses left breast [No Nipple Retraction] : no left nipple retraction [No Nipple Discharge] : no left nipple discharge [No Axillary Lymphadenopathy] : no left axillary lymphadenopathy [No Edema] : no edema [No Rashes] : no rashes [No Ulceration] : no ulceration [de-identified] : +thyromegaly [de-identified] : +stable lipoma L ant lat CW > observe [de-identified] : +FC tissue NSF  [de-identified] : +FC tissue NSF

## 2024-01-11 NOTE — HISTORY OF PRESENT ILLNESS
[FreeTextEntry1] : Pt had allergic rxn to CT Iodine dye and PCN. D/c'ed Zetia s/t sx's S/P R Br Bx (11/22/93): Benign +L Ov Cyst (TVU)(12/15/17) > 6 mos F/U rec'ed > had NOT had yet > discussed. Pt had elevated Vit B6 level > d/c'ed Seeing Ophth re: L optic nerve problems On Eliquis for A. FIb S/P ablation (11/21)([place]) > NSR Had pneumonia (8/22) > doxy > recoverd Colonoscopy (2020): "+polyp" PAP/Pelvic (7/23): "WNL"  Had COVID (12/22) > recovered Got Moderna booster (12/21)([facility]) No other MH/FH changes. ROS reviewed/discussed. Taking Ca/Vit D. Vit D level (2019): 36. Last Bone Densitometry (12/21): +osteopenia Mammo/Sono (12/22/23): FG, NSF Thyroid Sono (6/29/21): +stable multinod goiter > F/U Thyroid Sono (6/23)

## 2024-12-27 ENCOUNTER — APPOINTMENT (OUTPATIENT)
Dept: MAMMOGRAPHY | Facility: CLINIC | Age: 69
End: 2024-12-27
Payer: MEDICARE

## 2024-12-27 ENCOUNTER — RESULT REVIEW (OUTPATIENT)
Age: 69
End: 2024-12-27

## 2024-12-27 ENCOUNTER — APPOINTMENT (OUTPATIENT)
Dept: ULTRASOUND IMAGING | Facility: CLINIC | Age: 69
End: 2024-12-27
Payer: MEDICARE

## 2024-12-27 ENCOUNTER — APPOINTMENT (OUTPATIENT)
Dept: ULTRASOUND IMAGING | Facility: CLINIC | Age: 69
End: 2024-12-27

## 2024-12-27 PROCEDURE — 77063 BREAST TOMOSYNTHESIS BI: CPT

## 2024-12-27 PROCEDURE — 77067 SCR MAMMO BI INCL CAD: CPT

## 2024-12-27 PROCEDURE — 76830 TRANSVAGINAL US NON-OB: CPT

## 2024-12-27 PROCEDURE — 76856 US EXAM PELVIC COMPLETE: CPT

## 2025-01-09 ENCOUNTER — APPOINTMENT (OUTPATIENT)
Dept: BREAST CENTER | Facility: CLINIC | Age: 70
End: 2025-01-09
Payer: MEDICARE

## 2025-01-09 VITALS — HEIGHT: 62 IN | BODY MASS INDEX: 35.88 KG/M2 | WEIGHT: 195 LBS

## 2025-01-09 DIAGNOSIS — I48.0 PAROXYSMAL ATRIAL FIBRILLATION: ICD-10-CM

## 2025-01-09 DIAGNOSIS — Z80.0 FAMILY HISTORY OF MALIGNANT NEOPLASM OF DIGESTIVE ORGANS: ICD-10-CM

## 2025-01-09 DIAGNOSIS — N60.19 DIFFUSE CYSTIC MASTOPATHY OF UNSPECIFIED BREAST: ICD-10-CM

## 2025-01-09 DIAGNOSIS — R92.2 INCONCLUSIVE MAMMOGRAM: ICD-10-CM

## 2025-01-09 DIAGNOSIS — Z86.16 PERSONAL HISTORY OF COVID-19: ICD-10-CM

## 2025-01-09 DIAGNOSIS — E04.2 NONTOXIC MULTINODULAR GOITER: ICD-10-CM

## 2025-01-09 DIAGNOSIS — D17.9 BENIGN LIPOMATOUS NEOPLASM, UNSPECIFIED: ICD-10-CM

## 2025-01-09 DIAGNOSIS — R92.30 INCONCLUSIVE MAMMOGRAM: ICD-10-CM

## 2025-01-09 DIAGNOSIS — M85.80 OTHER SPECIFIED DISORDERS OF BONE DENSITY AND STRUCTURE, UNSPECIFIED SITE: ICD-10-CM

## 2025-01-09 PROCEDURE — 99213 OFFICE O/P EST LOW 20 MIN: CPT
